# Patient Record
Sex: MALE | Race: WHITE | NOT HISPANIC OR LATINO | Employment: FULL TIME | URBAN - METROPOLITAN AREA
[De-identification: names, ages, dates, MRNs, and addresses within clinical notes are randomized per-mention and may not be internally consistent; named-entity substitution may affect disease eponyms.]

---

## 2018-10-09 ENCOUNTER — TELEPHONE (OUTPATIENT)
Dept: UROLOGY | Facility: MEDICAL CENTER | Age: 33
End: 2018-10-09

## 2018-10-09 NOTE — TELEPHONE ENCOUNTER
Complaint/Diagnosis:Varicocele    Insurance:[employer]    History of Cancer:no    Previous Urologist:no    Outside testing/where:no    If yes,what kind:no    Records requested/where:no    Preferred location:[facility]

## 2018-10-23 ENCOUNTER — OFFICE VISIT (OUTPATIENT)
Dept: UROLOGY | Facility: MEDICAL CENTER | Age: 33
End: 2018-10-23
Payer: COMMERCIAL

## 2018-10-23 VITALS
SYSTOLIC BLOOD PRESSURE: 104 MMHG | DIASTOLIC BLOOD PRESSURE: 80 MMHG | HEIGHT: 70 IN | WEIGHT: 147 LBS | BODY MASS INDEX: 21.05 KG/M2

## 2018-10-23 DIAGNOSIS — N50.0 BILATERAL TESTICULAR ATROPHY: ICD-10-CM

## 2018-10-23 DIAGNOSIS — I86.1 VARICOCELE: ICD-10-CM

## 2018-10-23 DIAGNOSIS — N46.9 INFERTILITY MALE: ICD-10-CM

## 2018-10-23 DIAGNOSIS — N46.01 AZOOSPERMIA: Primary | ICD-10-CM

## 2018-10-23 LAB
SL AMB  POCT GLUCOSE, UA: ABNORMAL
SL AMB LEUKOCYTE ESTERASE,UA: ABNORMAL
SL AMB POCT BILIRUBIN,UA: ABNORMAL
SL AMB POCT BLOOD,UA: ABNORMAL
SL AMB POCT CLARITY,UA: CLEAR
SL AMB POCT COLOR,UA: YELLOW
SL AMB POCT KETONES,UA: ABNORMAL
SL AMB POCT NITRITE,UA: ABNORMAL
SL AMB POCT PH,UA: 6.5
SL AMB POCT SPECIFIC GRAVITY,UA: 1.02
SL AMB POCT URINE PROTEIN: ABNORMAL
SL AMB POCT UROBILINOGEN: 0.2

## 2018-10-23 PROCEDURE — 99204 OFFICE O/P NEW MOD 45 MIN: CPT | Performed by: UROLOGY

## 2018-10-23 PROCEDURE — 81003 URINALYSIS AUTO W/O SCOPE: CPT | Performed by: UROLOGY

## 2018-10-23 NOTE — PROGRESS NOTES
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  From the history, it sounds like he may have had mumps with bilateral ischemic orchitis leading to a sterile condition  However, he shows signs of good masculinization otherwise  My plan is to check a scrotal ultrasound, a serum testosterone and he will need to undergo repeat semen analysis in 3-4 months when he goes see Dr Joya Reddy again  Send him the results  Chief Complaint   No chief complaint on file  History of Present Illness   New patient office visit:  13-year-old man with infertility and 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 on semen analysis 3-4 weeks ago- maybe only 1 sperm  He has a history of a varicocele that goes back to when he was 4years old and he had severe bilateral testicular pain with swelling and erythema     No pain  No erectile dysfunction  He has normal ejaculate  No known history of mumps  He is [employer] and  at [employer]  The following portions of the patient's history were reviewed and updated as appropriate: allergies, current medications, past family history, past medical history, past social history, past surgical history and problem list     Review of Systems   Review of Systems   Genitourinary: Negative  Active Problem List   There is no problem list on file for this patient  Objective   There were no vitals taken for this visit  Physical Exam   Constitutional: He is oriented to person, place, and time  He appears well-developed and well-nourished  HENT:   Head: Normocephalic and atraumatic  Eyes: EOM are normal    Neck: Normal range of motion     Pulmonary/Chest: Effort normal    Abdominal: Soft  He exhibits no distension and no mass  There is no tenderness  There is no rebound and no guarding  Genitourinary: Penis normal    Genitourinary Comments: Testes: Both testicles are descended bilaterally and I feel no varicocele, both testicles are rather small but they are symmetric and there are no masses  Phallus:  Normal  circumcised phallus  Musculoskeletal: Normal range of motion  Neurological: He is alert and oriented to person, place, and time  Skin: Skin is warm and dry  Psychiatric: He has a normal mood and affect  His behavior is normal  Judgment and thought content normal            Current Medications   No current outpatient prescriptions on file          Collin Nur MD

## 2018-10-23 NOTE — LETTER
2018     Radha Babluis [facility]  [address]  [facility]  [address]     Patient: Diana Roe   YOB: 1985   Date of Visit: 10/23/2018       Dear Dr Rey Sauceda: Thank you for referring Diana Roe to me for evaluation  Below are my notes for this consultation  If you have questions, please do not hesitate to call me  I look forward to following your patient along with you  Sincerely,        Paula Gooden MD        CC: No Recipients  Paula Gooden MD  10/23/2018  2:26 PM  Sign at close encounter  [address] 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  From the history, it sounds like he may have had mumps with bilateral ischemic orchitis leading to a sterile condition  However, he shows signs of good masculinization otherwise  My plan is to check a scrotal ultrasound, a serum testosterone and he will need to undergo repeat semen analysis in 3-4 months when he goes see Dr Rey Sauceda again  Send him the results  Chief Complaint   No chief complaint on file  History of Present Illness   New patient office visit:  42-year-old man with infertility and 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 on semen analysis 3-4 weeks ago- maybe only 1 sperm  He has a history of a varicocele that goes back to when he was 4years old and he had severe bilateral testicular pain with swelling and erythema     No pain  No erectile dysfunction  He has normal ejaculate  No known history of mumps  He is [employer] and  at [employer]        The following portions of the patient's history were reviewed and updated as appropriate: allergies, current medications, past family history, past medical history, past social history, past surgical history and problem list     Review of Systems   Review of Systems   Genitourinary: Negative  Active Problem List   There is no problem list on file for this patient  Objective   There were no vitals taken for this visit  Physical Exam   Constitutional: He is oriented to person, place, and time  He appears well-developed and well-nourished  HENT:   Head: Normocephalic and atraumatic  Eyes: EOM are normal    Neck: Normal range of motion  Pulmonary/Chest: Effort normal    Abdominal: Soft  He exhibits no distension and no mass  There is no tenderness  There is no rebound and no guarding  Genitourinary: Penis normal    Genitourinary Comments: Testes: Both testicles are descended bilaterally and I feel no varicocele, both testicles are rather small but they are symmetric and there are no masses  Phallus:  Normal  circumcised phallus  Musculoskeletal: Normal range of motion  Neurological: He is alert and oriented to person, place, and time  Skin: Skin is warm and dry  Psychiatric: He has a normal mood and affect  His behavior is normal  Judgment and thought content normal            Current Medications   No current outpatient prescriptions on file          Nicholas Egan MD

## 2018-10-25 ENCOUNTER — HOSPITAL ENCOUNTER (OUTPATIENT)
Dept: ULTRASOUND IMAGING | Facility: HOSPITAL | Age: 33
Discharge: HOME | End: 2018-10-25
Attending: UROLOGY
Payer: COMMERCIAL

## 2018-10-25 DIAGNOSIS — N46.01 AZOOSPERMIA: ICD-10-CM

## 2018-10-25 DIAGNOSIS — N46.9 INFERTILITY MALE: ICD-10-CM

## 2018-10-25 DIAGNOSIS — N50.0 BILATERAL TESTICULAR ATROPHY: ICD-10-CM

## 2018-10-25 PROCEDURE — 76870 US EXAM SCROTUM: CPT

## 2018-10-30 ENCOUNTER — TELEPHONE (OUTPATIENT)
Dept: UROLOGY | Facility: MEDICAL CENTER | Age: 33
End: 2018-10-30

## 2018-10-30 NOTE — TELEPHONE ENCOUNTER
Spoke with patient ,given his ultrasound results  He has bilateral small testicles with good flow  He has a small to moderate varicocele  This is asymptomatic  I told him he no surgical therapy is warranted at this time and if he wishes to we can recheck a semen analysis in a few months  Otherwise I think prognosis for him to actually father a child is poor-I believe that he may have had mumps orchitis or some other viral orchitis when he was 4 that led to destruction of his spermatogenesis capabilities  He had his wife are interested in adoption  He will see me p r n Dara Soulier

## 2018-11-13 ENCOUNTER — TELEPHONE (OUTPATIENT)
Dept: UROLOGY | Facility: AMBULATORY SURGERY CENTER | Age: 33
End: 2018-11-13

## 2018-11-13 NOTE — TELEPHONE ENCOUNTER
Basically , the wife wants to know if there good be any blockages for the sperm? She is for reasons  for his problem,she said her husbands mother said he never had the mumps  I will send her a copy of the ULS also

## 2018-11-13 NOTE — TELEPHONE ENCOUNTER
Patients wife called to ask about ultrasound results  He does not have any upcoming appointments scheduled

## 2021-11-17 ENCOUNTER — NURSE TRIAGE (OUTPATIENT)
Dept: OTHER | Facility: OTHER | Age: 36
End: 2021-11-17

## 2021-11-17 DIAGNOSIS — Z20.822 SUSPECTED SEVERE ACUTE RESPIRATORY SYNDROME CORONAVIRUS 2 (SARS-COV-2) INFECTION: Primary | ICD-10-CM

## 2021-11-17 LAB — SARS-COV-2 RNA RESP QL NAA+PROBE: NEGATIVE

## 2021-11-17 PROCEDURE — U0003 INFECTIOUS AGENT DETECTION BY NUCLEIC ACID (DNA OR RNA); SEVERE ACUTE RESPIRATORY SYNDROME CORONAVIRUS 2 (SARS-COV-2) (CORONAVIRUS DISEASE [COVID-19]), AMPLIFIED PROBE TECHNIQUE, MAKING USE OF HIGH THROUGHPUT TECHNOLOGIES AS DESCRIBED BY CMS-2020-01-R: HCPCS | Performed by: FAMILY MEDICINE

## 2021-11-17 PROCEDURE — U0005 INFEC AGEN DETEC AMPLI PROBE: HCPCS | Performed by: FAMILY MEDICINE

## 2022-10-15 ENCOUNTER — APPOINTMENT (OUTPATIENT)
Dept: URGENT CARE | Facility: MEDICAL CENTER | Age: 37
End: 2022-10-15

## 2022-10-15 ENCOUNTER — APPOINTMENT (OUTPATIENT)
Dept: LAB | Facility: MEDICAL CENTER | Age: 37
End: 2022-10-15

## 2022-10-15 DIAGNOSIS — Z13.9 ENCOUNTER FOR SCREENING: ICD-10-CM

## 2022-10-15 DIAGNOSIS — Z13.9 ENCOUNTER FOR SCREENING: Primary | ICD-10-CM

## 2022-10-15 LAB
ANION GAP SERPL CALCULATED.3IONS-SCNC: 6 MMOL/L (ref 4–13)
BUN SERPL-MCNC: 14 MG/DL (ref 5–25)
CALCIUM SERPL-MCNC: 9.8 MG/DL (ref 8.3–10.1)
CHLORIDE SERPL-SCNC: 109 MMOL/L (ref 96–108)
CHOLEST SERPL-MCNC: 224 MG/DL
CO2 SERPL-SCNC: 26 MMOL/L (ref 21–32)
CREAT SERPL-MCNC: 1 MG/DL (ref 0.6–1.3)
GFR SERPL CREATININE-BSD FRML MDRD: 94 ML/MIN/1.73SQ M
GLUCOSE P FAST SERPL-MCNC: 105 MG/DL (ref 65–99)
HDLC SERPL-MCNC: 55 MG/DL
LDLC SERPL CALC-MCNC: 141 MG/DL (ref 0–100)
NONHDLC SERPL-MCNC: 169 MG/DL
POTASSIUM SERPL-SCNC: 3.8 MMOL/L (ref 3.5–5.3)
SODIUM SERPL-SCNC: 141 MMOL/L (ref 135–147)
TRIGL SERPL-MCNC: 139 MG/DL

## 2022-10-15 PROCEDURE — 80048 BASIC METABOLIC PNL TOTAL CA: CPT

## 2022-10-15 PROCEDURE — 36415 COLL VENOUS BLD VENIPUNCTURE: CPT

## 2022-10-15 PROCEDURE — 80061 LIPID PANEL: CPT

## 2023-11-25 ENCOUNTER — APPOINTMENT (OUTPATIENT)
Dept: URGENT CARE | Facility: MEDICAL CENTER | Age: 38
End: 2023-11-25

## 2023-11-25 ENCOUNTER — APPOINTMENT (OUTPATIENT)
Dept: LAB | Facility: MEDICAL CENTER | Age: 38
End: 2023-11-25

## 2023-11-25 DIAGNOSIS — Z13.9 SCREENING FOR UNSPECIFIED CONDITION: ICD-10-CM

## 2023-11-25 DIAGNOSIS — Z13.9 SCREENING FOR UNSPECIFIED CONDITION: Primary | ICD-10-CM

## 2023-11-25 LAB
ANION GAP SERPL CALCULATED.3IONS-SCNC: 9 MMOL/L
BUN SERPL-MCNC: 13 MG/DL (ref 5–25)
CALCIUM SERPL-MCNC: 9.3 MG/DL (ref 8.4–10.2)
CHLORIDE SERPL-SCNC: 105 MMOL/L (ref 96–108)
CHOLEST SERPL-MCNC: 251 MG/DL
CO2 SERPL-SCNC: 28 MMOL/L (ref 21–32)
CREAT SERPL-MCNC: 0.97 MG/DL (ref 0.6–1.3)
GFR SERPL CREATININE-BSD FRML MDRD: 97 ML/MIN/1.73SQ M
GLUCOSE P FAST SERPL-MCNC: 108 MG/DL (ref 65–99)
HDLC SERPL-MCNC: 53 MG/DL
LDLC SERPL CALC-MCNC: 160 MG/DL (ref 0–100)
NONHDLC SERPL-MCNC: 198 MG/DL
POTASSIUM SERPL-SCNC: 4.2 MMOL/L (ref 3.5–5.3)
SODIUM SERPL-SCNC: 142 MMOL/L (ref 135–147)
TRIGL SERPL-MCNC: 190 MG/DL

## 2023-11-25 PROCEDURE — 80061 LIPID PANEL: CPT

## 2023-11-25 PROCEDURE — 80048 BASIC METABOLIC PNL TOTAL CA: CPT

## 2023-11-25 PROCEDURE — 36415 COLL VENOUS BLD VENIPUNCTURE: CPT

## 2024-10-26 ENCOUNTER — OCCMED (OUTPATIENT)
Dept: URGENT CARE | Facility: CLINIC | Age: 39
End: 2024-10-26

## 2024-10-26 DIAGNOSIS — Z13.9 SCREENING FOR UNSPECIFIED CONDITION: Primary | ICD-10-CM

## 2024-10-26 LAB
ANION GAP SERPL CALCULATED.3IONS-SCNC: 15 MMOL/L (ref 4–13)
BUN SERPL-MCNC: 13 MG/DL (ref 5–25)
CALCIUM SERPL-MCNC: 9.6 MG/DL (ref 8.4–10.2)
CHLORIDE SERPL-SCNC: 104 MMOL/L (ref 96–108)
CHOLEST SERPL-MCNC: 256 MG/DL
CO2 SERPL-SCNC: 24 MMOL/L (ref 21–32)
CREAT SERPL-MCNC: 0.82 MG/DL (ref 0.6–1.3)
GFR SERPL CREATININE-BSD FRML MDRD: 109 ML/MIN/1.73SQ M
GLUCOSE P FAST SERPL-MCNC: 91 MG/DL (ref 65–99)
HDLC SERPL-MCNC: 51 MG/DL
LDLC SERPL CALC-MCNC: 169 MG/DL (ref 0–100)
NONHDLC SERPL-MCNC: 205 MG/DL
POTASSIUM SERPL-SCNC: 4.2 MMOL/L (ref 3.5–5.3)
SODIUM SERPL-SCNC: 143 MMOL/L (ref 135–147)
TRIGL SERPL-MCNC: 182 MG/DL

## 2024-10-26 PROCEDURE — 80048 BASIC METABOLIC PNL TOTAL CA: CPT | Performed by: PHYSICIAN ASSISTANT

## 2024-10-26 PROCEDURE — 80061 LIPID PANEL: CPT | Performed by: PHYSICIAN ASSISTANT
